# Patient Record
Sex: FEMALE
[De-identification: names, ages, dates, MRNs, and addresses within clinical notes are randomized per-mention and may not be internally consistent; named-entity substitution may affect disease eponyms.]

---

## 2018-01-11 ENCOUNTER — SURGERY (OUTPATIENT)
Age: 28
End: 2018-01-11

## 2018-01-11 ENCOUNTER — ANESTHESIA EVENT (OUTPATIENT)
Dept: SURGERY | Facility: CLINIC | Age: 28
End: 2018-01-11
Payer: COMMERCIAL

## 2018-01-11 ENCOUNTER — HOSPITAL ENCOUNTER (OUTPATIENT)
Facility: CLINIC | Age: 28
Discharge: HOME | End: 2018-01-11
Attending: DENTIST | Admitting: DENTIST
Payer: COMMERCIAL

## 2018-01-11 ENCOUNTER — ANESTHESIA (OUTPATIENT)
Dept: SURGERY | Facility: CLINIC | Age: 28
End: 2018-01-11
Payer: COMMERCIAL

## 2018-01-11 VITALS
TEMPERATURE: 97 F | OXYGEN SATURATION: 100 % | HEIGHT: 65 IN | RESPIRATION RATE: 18 BRPM | BODY MASS INDEX: 18.49 KG/M2 | WEIGHT: 111 LBS | SYSTOLIC BLOOD PRESSURE: 113 MMHG | DIASTOLIC BLOOD PRESSURE: 78 MMHG

## 2018-01-11 LAB
HCG UR QL: NEGATIVE
SEE SCANNED REPORT: NORMAL

## 2018-01-11 PROCEDURE — 27210995 ZZH RX 272: Performed by: DENTIST

## 2018-01-11 PROCEDURE — 25000132 ZZH RX MED GY IP 250 OP 250 PS 637: Performed by: DENTIST

## 2018-01-11 PROCEDURE — 71000013 ZZH RECOVERY PHASE 1 LEVEL 1 EA ADDTL HR: Performed by: DENTIST

## 2018-01-11 PROCEDURE — 25000128 H RX IP 250 OP 636: Performed by: DENTIST

## 2018-01-11 PROCEDURE — 25000125 ZZHC RX 250: Performed by: NURSE ANESTHETIST, CERTIFIED REGISTERED

## 2018-01-11 PROCEDURE — 00000159 ZZHCL STATISTIC H-SEND OUTS PREP: Performed by: DENTIST

## 2018-01-11 PROCEDURE — 71000027 ZZH RECOVERY PHASE 2 EACH 15 MINS: Performed by: DENTIST

## 2018-01-11 PROCEDURE — 36000063 ZZH SURGERY LEVEL 4 EA 15 ADDTL MIN: Performed by: DENTIST

## 2018-01-11 PROCEDURE — 81025 URINE PREGNANCY TEST: CPT | Performed by: ANESTHESIOLOGY

## 2018-01-11 PROCEDURE — 25000128 H RX IP 250 OP 636: Performed by: NURSE ANESTHETIST, CERTIFIED REGISTERED

## 2018-01-11 PROCEDURE — 71000012 ZZH RECOVERY PHASE 1 LEVEL 1 FIRST HR: Performed by: DENTIST

## 2018-01-11 PROCEDURE — 27210794 ZZH OR GENERAL SUPPLY STERILE: Performed by: DENTIST

## 2018-01-11 PROCEDURE — 40000170 ZZH STATISTIC PRE-PROCEDURE ASSESSMENT II: Performed by: DENTIST

## 2018-01-11 PROCEDURE — 37000008 ZZH ANESTHESIA TECHNICAL FEE, 1ST 30 MIN: Performed by: DENTIST

## 2018-01-11 PROCEDURE — 25000125 ZZHC RX 250: Performed by: DENTIST

## 2018-01-11 PROCEDURE — 36000093 ZZH SURGERY LEVEL 4 1ST 30 MIN: Performed by: DENTIST

## 2018-01-11 PROCEDURE — 37000009 ZZH ANESTHESIA TECHNICAL FEE, EACH ADDTL 15 MIN: Performed by: DENTIST

## 2018-01-11 RX ORDER — ONDANSETRON 2 MG/ML
4 INJECTION INTRAMUSCULAR; INTRAVENOUS EVERY 30 MIN PRN
Status: DISCONTINUED | OUTPATIENT
Start: 2018-01-11 | End: 2018-01-11 | Stop reason: HOSPADM

## 2018-01-11 RX ORDER — ONDANSETRON 4 MG/1
4 TABLET, ORALLY DISINTEGRATING ORAL EVERY 30 MIN PRN
Status: DISCONTINUED | OUTPATIENT
Start: 2018-01-11 | End: 2018-01-11 | Stop reason: HOSPADM

## 2018-01-11 RX ORDER — PROPOFOL 10 MG/ML
INJECTION, EMULSION INTRAVENOUS PRN
Status: DISCONTINUED | OUTPATIENT
Start: 2018-01-11 | End: 2018-01-11

## 2018-01-11 RX ORDER — PROPOFOL 10 MG/ML
INJECTION, EMULSION INTRAVENOUS CONTINUOUS PRN
Status: DISCONTINUED | OUTPATIENT
Start: 2018-01-11 | End: 2018-01-11

## 2018-01-11 RX ORDER — FENTANYL CITRATE 50 UG/ML
25-50 INJECTION, SOLUTION INTRAMUSCULAR; INTRAVENOUS
Status: DISCONTINUED | OUTPATIENT
Start: 2018-01-11 | End: 2018-01-11 | Stop reason: HOSPADM

## 2018-01-11 RX ORDER — BUPIVACAINE HYDROCHLORIDE AND EPINEPHRINE 5; 5 MG/ML; UG/ML
INJECTION, SOLUTION PERINEURAL PRN
Status: DISCONTINUED | OUTPATIENT
Start: 2018-01-11 | End: 2018-01-11 | Stop reason: HOSPADM

## 2018-01-11 RX ORDER — ONDANSETRON 2 MG/ML
INJECTION INTRAMUSCULAR; INTRAVENOUS PRN
Status: DISCONTINUED | OUTPATIENT
Start: 2018-01-11 | End: 2018-01-11

## 2018-01-11 RX ORDER — MEPERIDINE HYDROCHLORIDE 25 MG/ML
12.5 INJECTION INTRAMUSCULAR; INTRAVENOUS; SUBCUTANEOUS
Status: DISCONTINUED | OUTPATIENT
Start: 2018-01-11 | End: 2018-01-11 | Stop reason: HOSPADM

## 2018-01-11 RX ORDER — GLYCOPYRROLATE 0.2 MG/ML
INJECTION, SOLUTION INTRAMUSCULAR; INTRAVENOUS PRN
Status: DISCONTINUED | OUTPATIENT
Start: 2018-01-11 | End: 2018-01-11

## 2018-01-11 RX ORDER — PHYSOSTIGMINE SALICYLATE 1 MG/ML
1.2 INJECTION INTRAVENOUS
Status: DISCONTINUED | OUTPATIENT
Start: 2018-01-11 | End: 2018-01-11 | Stop reason: HOSPADM

## 2018-01-11 RX ORDER — NALOXONE HYDROCHLORIDE 0.4 MG/ML
.1-.4 INJECTION, SOLUTION INTRAMUSCULAR; INTRAVENOUS; SUBCUTANEOUS
Status: DISCONTINUED | OUTPATIENT
Start: 2018-01-11 | End: 2018-01-11 | Stop reason: HOSPADM

## 2018-01-11 RX ORDER — SODIUM CHLORIDE, SODIUM LACTATE, POTASSIUM CHLORIDE, CALCIUM CHLORIDE 600; 310; 30; 20 MG/100ML; MG/100ML; MG/100ML; MG/100ML
INJECTION, SOLUTION INTRAVENOUS CONTINUOUS PRN
Status: DISCONTINUED | OUTPATIENT
Start: 2018-01-11 | End: 2018-01-11

## 2018-01-11 RX ORDER — SODIUM CHLORIDE, SODIUM LACTATE, POTASSIUM CHLORIDE, CALCIUM CHLORIDE 600; 310; 30; 20 MG/100ML; MG/100ML; MG/100ML; MG/100ML
INJECTION, SOLUTION INTRAVENOUS CONTINUOUS
Status: DISCONTINUED | OUTPATIENT
Start: 2018-01-11 | End: 2018-01-11 | Stop reason: HOSPADM

## 2018-01-11 RX ORDER — MAGNESIUM HYDROXIDE 1200 MG/15ML
LIQUID ORAL PRN
Status: DISCONTINUED | OUTPATIENT
Start: 2018-01-11 | End: 2018-01-11 | Stop reason: HOSPADM

## 2018-01-11 RX ORDER — METHYLPREDNISOLONE SODIUM SUCCINATE 125 MG/2ML
125 INJECTION, POWDER, LYOPHILIZED, FOR SOLUTION INTRAMUSCULAR; INTRAVENOUS ONCE
Status: COMPLETED | OUTPATIENT
Start: 2018-01-11 | End: 2018-01-11

## 2018-01-11 RX ORDER — FENTANYL CITRATE 50 UG/ML
INJECTION, SOLUTION INTRAMUSCULAR; INTRAVENOUS PRN
Status: DISCONTINUED | OUTPATIENT
Start: 2018-01-11 | End: 2018-01-11

## 2018-01-11 RX ORDER — CHLORHEXIDINE GLUCONATE ORAL RINSE 1.2 MG/ML
10 SOLUTION DENTAL ONCE
Status: COMPLETED | OUTPATIENT
Start: 2018-01-11 | End: 2018-01-11

## 2018-01-11 RX ORDER — HYDROMORPHONE HYDROCHLORIDE 1 MG/ML
.3-.5 INJECTION, SOLUTION INTRAMUSCULAR; INTRAVENOUS; SUBCUTANEOUS EVERY 10 MIN PRN
Status: DISCONTINUED | OUTPATIENT
Start: 2018-01-11 | End: 2018-01-11 | Stop reason: HOSPADM

## 2018-01-11 RX ORDER — NEOSTIGMINE METHYLSULFATE 1 MG/ML
VIAL (ML) INJECTION PRN
Status: DISCONTINUED | OUTPATIENT
Start: 2018-01-11 | End: 2018-01-11

## 2018-01-11 RX ORDER — KETOROLAC TROMETHAMINE 30 MG/ML
30 INJECTION, SOLUTION INTRAMUSCULAR; INTRAVENOUS ONCE
Status: DISCONTINUED | OUTPATIENT
Start: 2018-01-11 | End: 2018-01-11 | Stop reason: HOSPADM

## 2018-01-11 RX ORDER — LIDOCAINE HYDROCHLORIDE 20 MG/ML
INJECTION, SOLUTION INFILTRATION; PERINEURAL PRN
Status: DISCONTINUED | OUTPATIENT
Start: 2018-01-11 | End: 2018-01-11

## 2018-01-11 RX ORDER — FENTANYL CITRATE 50 UG/ML
25-50 INJECTION, SOLUTION INTRAMUSCULAR; INTRAVENOUS EVERY 5 MIN PRN
Status: DISCONTINUED | OUTPATIENT
Start: 2018-01-11 | End: 2018-01-11 | Stop reason: HOSPADM

## 2018-01-11 RX ADMIN — ROCURONIUM BROMIDE 15 MG: 10 INJECTION INTRAVENOUS at 10:36

## 2018-01-11 RX ADMIN — GLYCOPYRROLATE 0.4 MG: 0.2 INJECTION, SOLUTION INTRAMUSCULAR; INTRAVENOUS at 11:36

## 2018-01-11 RX ADMIN — METHYLPREDNISOLONE SODIUM SUCCINATE 125 MG: 125 INJECTION, POWDER, FOR SOLUTION INTRAMUSCULAR; INTRAVENOUS at 10:47

## 2018-01-11 RX ADMIN — BUPIVACAINE HYDROCHLORIDE AND EPINEPHRINE BITARTRATE 6 ML: 5; .005 INJECTION, SOLUTION PERINEURAL at 10:54

## 2018-01-11 RX ADMIN — LIDOCAINE HYDROCHLORIDE,EPINEPHRINE BITARTRATE 8 ML: 20; .01 INJECTION, SOLUTION INFILTRATION; PERINEURAL at 10:52

## 2018-01-11 RX ADMIN — ONDANSETRON 4 MG: 2 INJECTION INTRAMUSCULAR; INTRAVENOUS at 11:15

## 2018-01-11 RX ADMIN — SODIUM CHLORIDE 1000 ML: 900 IRRIGANT IRRIGATION at 11:10

## 2018-01-11 RX ADMIN — LIDOCAINE HYDROCHLORIDE 40 MG: 20 INJECTION, SOLUTION INFILTRATION; PERINEURAL at 10:36

## 2018-01-11 RX ADMIN — CHLORHEXIDINE GLUCONATE 15 ML: 1.2 RINSE ORAL at 09:32

## 2018-01-11 RX ADMIN — FENTANYL CITRATE 25 MCG: 50 INJECTION, SOLUTION INTRAMUSCULAR; INTRAVENOUS at 10:51

## 2018-01-11 RX ADMIN — PROPOFOL 200 MG: 10 INJECTION, EMULSION INTRAVENOUS at 10:36

## 2018-01-11 RX ADMIN — PROPOFOL 200 MCG/KG/MIN: 10 INJECTION, EMULSION INTRAVENOUS at 10:36

## 2018-01-11 RX ADMIN — FENTANYL CITRATE 50 MCG: 50 INJECTION, SOLUTION INTRAMUSCULAR; INTRAVENOUS at 10:35

## 2018-01-11 RX ADMIN — NEOSTIGMINE METHYLSULFATE 3 MG: 1 INJECTION INTRAMUSCULAR; INTRAVENOUS; SUBCUTANEOUS at 11:36

## 2018-01-11 RX ADMIN — MIDAZOLAM 2 MG: 1 INJECTION INTRAMUSCULAR; INTRAVENOUS at 10:35

## 2018-01-11 RX ADMIN — SODIUM CHLORIDE, POTASSIUM CHLORIDE, SODIUM LACTATE AND CALCIUM CHLORIDE: 600; 310; 30; 20 INJECTION, SOLUTION INTRAVENOUS at 10:34

## 2018-01-11 RX ADMIN — SODIUM CHLORIDE, POTASSIUM CHLORIDE, SODIUM LACTATE AND CALCIUM CHLORIDE: 600; 310; 30; 20 INJECTION, SOLUTION INTRAVENOUS at 11:37

## 2018-01-11 RX ADMIN — CEFAZOLIN SODIUM 2 G: 2 SOLUTION INTRAVENOUS at 10:43

## 2018-01-11 NOTE — DISCHARGE INSTRUCTIONS
Same Day Surgery Discharge Instructions for  Sedation and General Anesthesia       It's not unusual to feel dizzy, light-headed or faint for up to 24 hours after surgery or while taking pain medication.  If you have these symptoms: sit for a few minutes before standing and have someone assist you when you get up to walk or use the bathroom.      You should rest and relax for the next 24 hours. We recommend you make arrangements to have an adult stay with you for at least 24 hours after your discharge.  Avoid hazardous and strenuous activity.      DO NOT DRIVE any vehicle or operate mechanical equipment for 24 hours following the end of your surgery.  Even though you may feel normal, your reactions may be affected by the medication you have received.      Do not drink alcoholic beverages for 24 hours following surgery.       Slowly progress to your regular diet as you feel able. It's not unusual to feel nauseated and/or vomit after receiving anesthesia.  If you develop these symptoms, drink clear liquids (apple juice, ginger ale, broth, 7-up, etc. ) until you feel better.  If your nausea and vomiting persists for 24 hours, please notify your surgeon.        All narcotic pain medications, along with inactivity and anesthesia, can cause constipation. Drinking plenty of liquids and increasing fiber intake will help.      For any questions of a medical nature, call your surgeon.      Do not make important decisions for 24 hours.      If you had general anesthesia, you may have a sore throat for a couple of days related to the breathing tube used during surgery.  You may use Cepacol lozenges to help with this discomfort.  If it worsens or if you develop a fever, contact your surgeon.       If you feel your pain is not well managed with the pain medications prescribed by your surgeon, please contact your surgeon's office to let them know so they can address your concerns.         MOUTH CARE FOLLOWING ORAL SURGERY  [facility]  Efrain Carpio, Charity, Esther, Joe, Cal Royal, Jennifer Limon, Chelsea, Nolan      Immediately following your surgery:   - When you get home, remove the gauze.  Do not replace unless you see active  bleeding (pooling/dripping).   - Begin brushing your teeth the evening of surgery.   - Avoid rigorous exercise and get adequate rest for the first two to three days.   - If antibiotics are prescribed, please begin taking these the day of surgery.  If you  are nauseated, wait until the next day.   - Do not drive for 24 hours after having I.V. Anesthesia.  Do not drive while taking a prescription pain medication (not including prescription ibuprofen).   - For the first 7 days, avoid smoking, spitting, drinking with straws, or vigorous rinsing.    1.  BLEEDING:  Some oozing may continue for the first 24-48 hours and is not unexpected.  If bleeding persists, apply the gauze directly over the extraction site and bite down firmly for 30 minutes.  You may remove the gauze to eat or drink and replace if needed.  2.  SWELLING:  Apply ice packs on and off for 30 minutes for 24-48 hours after your surgery, excluding overnight, to the outside of your face over the surgery site(s).  Do not apply heat.  Swelling is to be expected following surgery and peaks 2-3 days after.  Elevating your head in the first 48 hours will minimize swelling.  3.  TOBACCO:  Do not smoke or use tobacco products for 7 days.  Smoking greatly increases your risk of infections and dry sockets and will delay healing.  4.  RINSES:  Beginning the day after surgery, dissolve 1/4 teaspoon of salt in a full glass of warm water.  Rinse four times per day (after meals and before bed) for one week.  If given a prescription mouth rinse, use that in addition to salt water rinses for one week, starting the day of surgery.  5.  DIET:  After surgery while you are still numb, eat cool, soft foods.  Once numbness wears  "off, eat any diet you can tolerate excluding peanuts, popcorn, chips and other crunchy foods that are likely to become \"stuck\" in extraction sockets.  6.  REST & FLUIDS:  After I.V. anesthesia, drink plenty of fluids.  Be sure to get 8-10 hours of sleep at night.  Do NOT use straws for one week following surgery.  7.  PAIN:  If given prescription ibuprofen, take as directed.  Otherwise we recommend 600mg (three over-the-counter ibuprofen) immediately after surgery and then every 6 hours for two to three days.  The stronger pain medication may be used in addition if necessary (make sure to take with food to avoid nausea).   8.  FEVER:  A low grade fever is likely.  If questionable, do not hesitate to call.  9.  NAUSEA:  Nausea may occur following general anesthesia.  Treat with clear liquids and advance diet as tolerated.  If vomiting is a problem, call our office.  10.  STITCHES:  If stitches were used, they are usually dissolvable.  You will be advised by our office if you have sutures that require removal at a later appointment.  11.  DRY SOCKETS:  Soreness is common for the first couple of days after surgery.  If pain increases (throbbing, waking up at night) after 3-6 days, call our office.  12.  NUMBNESS:  We often use a long-acting local anesthetic that may remain in effect the entire day.        EMERGENCY CALLS  If you have questions or concerns, please call our office between the hours of 7am to 4pm Monday through Friday.  If you have an emergency, please call our office; if during non-business hours, the answering service will contact the doctor on call.  We do not refill pain prescriptions during the evenings or weekends.    April Alba  [facility]  [phone]         **If you have questions or concerns about your procedure,   call Dr. Mccall at [phone]**            "

## 2018-01-11 NOTE — IP AVS SNAPSHOT
MRN:[id number]                      After Visit Summary   1/11/2018    Brigida Whyte    MRN: [id number]           Thank you!     Thank you for choosing [facility] for your care. Our goal is always to provide you with excellent care. Hearing back from our patients is one way we can continue to improve our services. Please take a few minutes to complete the written survey that you may receive in the mail after you visit with us. Thank you!        Patient Information     Date Of Birth          1990        About your hospital stay     You were admitted on:  January 11, 2018 [facility]    You were discharged on:  January 11, 2018        Reason for your hospital stay       SDS extract impacted tooth 27 and excision odontogenic lesion mandible.                  Who to Call     For medical emergencies, please call 911.  For non-urgent questions about your medical care, please call your primary care provider or clinic, None  For questions related to your surgery, please call your surgery clinic        Attending Provider     Provider Raúl Correa DDS Oral Surgery       Primary Care Provider Fax #    Physician No Ref-Primary [phone]      Follow-up Appointments     Follow-up and recommended labs and tests        FU check in clinic 5-7 days.                  Further instructions from your care team         Same Day Surgery Discharge Instructions for  Sedation and General Anesthesia       It's not unusual to feel dizzy, light-headed or faint for up to 24 hours after surgery or while taking pain medication.  If you have these symptoms: sit for a few minutes before standing and have someone assist you when you get up to walk or use the bathroom.      You should rest and relax for the next 24 hours. We recommend you make arrangements to have an adult stay with you for at least 24 hours after your discharge.  Avoid hazardous and strenuous  activity.      DO NOT DRIVE any vehicle or operate mechanical equipment for 24 hours following the end of your surgery.  Even though you may feel normal, your reactions may be affected by the medication you have received.      Do not drink alcoholic beverages for 24 hours following surgery.       Slowly progress to your regular diet as you feel able. It's not unusual to feel nauseated and/or vomit after receiving anesthesia.  If you develop these symptoms, drink clear liquids (apple juice, ginger ale, broth, 7-up, etc. ) until you feel better.  If your nausea and vomiting persists for 24 hours, please notify your surgeon.        All narcotic pain medications, along with inactivity and anesthesia, can cause constipation. Drinking plenty of liquids and increasing fiber intake will help.      For any questions of a medical nature, call your surgeon.      Do not make important decisions for 24 hours.      If you had general anesthesia, you may have a sore throat for a couple of days related to the breathing tube used during surgery.  You may use Cepacol lozenges to help with this discomfort.  If it worsens or if you develop a fever, contact your surgeon.       If you feel your pain is not well managed with the pain medications prescribed by your surgeon, please contact your surgeon's office to let them know so they can address your concerns.         MOUTH CARE FOLLOWING ORAL SURGERY  [facility]  Charity Perdomo, Esther, Joe, Cal Royal, Jennifer Limon, Chelsea, Nolan      Immediately following your surgery:   - When you get home, remove the gauze.  Do not replace unless you see active  bleeding (pooling/dripping).   - Begin brushing your teeth the evening of surgery.   - Avoid rigorous exercise and get adequate rest for the first two to three days.   - If antibiotics are prescribed, please begin taking these the day of surgery.  If you  are nauseated, wait until the  "next day.   - Do not drive for 24 hours after having I.V. Anesthesia.  Do not drive while taking a prescription pain medication (not including prescription ibuprofen).   - For the first 7 days, avoid smoking, spitting, drinking with straws, or vigorous rinsing.    1.  BLEEDING:  Some oozing may continue for the first 24-48 hours and is not unexpected.  If bleeding persists, apply the gauze directly over the extraction site and bite down firmly for 30 minutes.  You may remove the gauze to eat or drink and replace if needed.  2.  SWELLING:  Apply ice packs on and off for 30 minutes for 24-48 hours after your surgery, excluding overnight, to the outside of your face over the surgery site(s).  Do not apply heat.  Swelling is to be expected following surgery and peaks 2-3 days after.  Elevating your head in the first 48 hours will minimize swelling.  3.  TOBACCO:  Do not smoke or use tobacco products for 7 days.  Smoking greatly increases your risk of infections and dry sockets and will delay healing.  4.  RINSES:  Beginning the day after surgery, dissolve 1/4 teaspoon of salt in a full glass of warm water.  Rinse four times per day (after meals and before bed) for one week.  If given a prescription mouth rinse, use that in addition to salt water rinses for one week, starting the day of surgery.  5.  DIET:  After surgery while you are still numb, eat cool, soft foods.  Once numbness wears off, eat any diet you can tolerate excluding peanuts, popcorn, chips and other crunchy foods that are likely to become \"stuck\" in extraction sockets.  6.  REST & FLUIDS:  After I.V. anesthesia, drink plenty of fluids.  Be sure to get 8-10 hours of sleep at night.  Do NOT use straws for one week following surgery.  7.  PAIN:  If given prescription ibuprofen, take as directed.  Otherwise we recommend 600mg (three over-the-counter ibuprofen) immediately after surgery and then every 6 hours for two to three days.  The stronger pain " "medication may be used in addition if necessary (make sure to take with food to avoid nausea).   8.  FEVER:  A low grade fever is likely.  If questionable, do not hesitate to call.  9.  NAUSEA:  Nausea may occur following general anesthesia.  Treat with clear liquids and advance diet as tolerated.  If vomiting is a problem, call our office.  10.  STITCHES:  If stitches were used, they are usually dissolvable.  You will be advised by our office if you have sutures that require removal at a later appointment.  11.  DRY SOCKETS:  Soreness is common for the first couple of days after surgery.  If pain increases (throbbing, waking up at night) after 3-6 days, call our office.  12.  NUMBNESS:  We often use a long-acting local anesthetic that may remain in effect the entire day.        EMERGENCY CALLS  If you have questions or concerns, please call our office between the hours of 7am to 4pm Monday through Friday.  If you have an emergency, please call our office; if during non-business hours, the answering service will contact the doctor on call.  We do not refill pain prescriptions during the evenings or weekends.    [place]   [facility]  [phone]         **If you have questions or concerns about your procedure,   call Dr. Mccall at [phone]**              Pending Results     Date and Time Order Name Status Description    1/11/2018 1119 Surgical pathology exam In process             Admission Information     Date & Time Provider Department Dept. Phone    1/11/2018 Raúl Mccall, [facility] Same Day Surgery [phone]      Your Vitals Were     Blood Pressure Temperature Respirations Height Weight Last Period    113/78 97  F (36.1  C) (Temporal) 18 1.651 m (5' 5\") 50.3 kg (111 lb) 01/09/2018    Pulse Oximetry BMI (Body Mass Index)                100% 18.47 kg/m2          MyChart Information     [employer] lets you send messages to your doctor, " "view your test results, renew your prescriptions, schedule appointments and more. To sign up, go to [email or web address] . Click on \"Log in\" on the left side of the screen, which will take you to the Welcome page. Then click on \"Sign up Now\" on the right side of the page.     You will be asked to enter the access code listed below, as well as some personal information. Please follow the directions to create your username and password.     Your access code is: [email or web address]  Expires: 2018 12:08 PM     Your access code will  in 90 days. If you need help or a new code, please call your [facility] clinic or [phone].        Care EveryWhere ID     This is your Care EveryWhere ID. This could be used by other organizations to access your [facility] medical records  [id number]        Equal Access to Services     [facility] : Karel Sumner, bernardo amaral, toni rivers, iftikhar bradley . So [facility] [phone].    ATENCIÓN: Si habla español, tiene a hull disposición servicios gratuitos de asistencia lingüística. Sariahkoko al [phone].    We comply with applicable federal civil rights laws and [place] laws. We do not discriminate on the basis of race, color, national origin, age, disability, sex, sexual orientation, or gender identity.               Review of your medicines      UNREVIEWED medicines. Ask your doctor about these medicines        Dose / Directions    IBUPROFEN PO        Dose:  200 mg   Take 200 mg by mouth every 4 hours as needed for moderate pain   Refills:  0                Protect others around you: Learn how to safely use, store and throw away your medicines at www.disposemymeds.org.             Medication List: This is a list of all your medications and when to take them. Check marks below indicate your daily home schedule. Keep this list as a reference.      Medications           Morning Afternoon Evening Bedtime As Needed    IBUPROFEN " PO   Take 200 mg by mouth every 4 hours as needed for moderate pain

## 2018-01-11 NOTE — ANESTHESIA POSTPROCEDURE EVALUATION
Patient: Brigida Whyte    Procedure(s):  EXTRACT TOOTH #27 ALONG WITH CYST AROUND TOOTH #27 - Wound Class: II-Clean Contaminated   - Wound Class: I-Clean    Diagnosis:IMPACTED TOOTH, OTOGYNCT CYST  Diagnosis Additional Information: No value filed.    Anesthesia Type:  General, ETT    Note:  Anesthesia Post Evaluation    Patient location during evaluation: PACU  Patient participation: Able to fully participate in evaluation  Level of consciousness: awake  Pain management: adequate  Airway patency: patent  Cardiovascular status: acceptable  Respiratory status: acceptable  Hydration status: acceptable  PONV: none     Anesthetic complications: None          Last vitals:  Vitals:    01/11/18 0922 01/11/18 1155 01/11/18 1200   BP: 114/77 118/86 110/77   Resp: 16 13 20   Temp: 36.1  C (97  F) 36.1  C (96.9  F)    SpO2: 100% 100% 100%         Electronically Signed By: Khoi Hernandez MD  January 11, 2018  12:12 PM

## 2018-01-11 NOTE — ANESTHESIA PREPROCEDURE EVALUATION
Anesthesia Evaluation     . Pt has had prior anesthetic. Type: MAC    No history of anesthetic complications          ROS/MED HX    ENT/Pulmonary:       Neurologic:       Cardiovascular:         METS/Exercise Tolerance:     Hematologic:         Musculoskeletal:         GI/Hepatic:     (+) hepatitis (Hepatitis B carrier) type B,       Renal/Genitourinary:         Endo:         Psychiatric:         Infectious Disease:         Malignancy:         Other:                     Physical Exam  Normal systems: cardiovascular and pulmonary    Airway   Mallampati: II  TM distance: >3 FB  Neck ROM: full    Dental     Cardiovascular       Pulmonary                     Anesthesia Plan      History & Physical Review  History and physical reviewed and following examination; no interval change.    ASA Status:  2 .    NPO Status:  > 8 hours    Plan for General and ETT with Intravenous and Propofol induction. Maintenance will be TIVA.    PONV prophylaxis:  Ondansetron (or other 5HT-3) and Dexamethasone or Solumedrol       Postoperative Care  Postoperative pain management:  IV analgesics and Oral pain medications.      Consents  Anesthetic plan, risks, benefits and alternatives discussed with:  Patient..      DPreop diagnosis: IMPACTED TOOTH, OTOGYNCT CYST  Procedure(s):  EXTRACTION(S) DENTAL  EXCISE BONE CYST MANDIBULAR  No Known Allergies    No current facility-administered medications on file prior to encounter.   No current outpatient prescriptions on file prior to encounter.  No results found for: HGB, INR, POTASSIUM                  .

## 2018-01-11 NOTE — OP NOTE
Oral & Maxillofacial Surgery Immediate Post-Operative Note    Date/Time:  2018     Patient Information:  Patient Name: Brigida Whyte  MRN: [id number]   : 1990    Pre-Op Diagnosis(es):  1.  Impacted tooth 27   2.  Compound Odontoma/Odontogenic lesion    Post-Op Diagnosis(es):  1.  same  2.      Procedure(s):  1.  Surgical extraction #27  2.  Excision Odontogenic Lesion Mandible    Anesthesia:  General Anesthesia via Oral tube    Surgeon(s):  Raúl Mccall  Assistant(s):  Jelly    Estimated Blood Loss:  4cc  Replacements:  Crystalloid  Drains:  none    Specimens:  Submitted tooth and odontogenic lesion    Complications:  none    Findings:  As dictated.    Signature:  Raúl Mccall    [facility]  [address], [address]  [place] [address]  Clinic/On-call Phone: [phone]  Clinic Fax: [phone]

## 2018-01-11 NOTE — ANESTHESIA CARE TRANSFER NOTE
Patient: Brigida Whyte    Procedure(s):  EXTRACT TOOTH #27 ALONG WITH CYST AROUND TOOTH #27 - Wound Class: II-Clean Contaminated   - Wound Class: I-Clean    Diagnosis: IMPACTED TOOTH, OTOGYNCT CYST  Diagnosis Additional Information: No value filed.    Anesthesia Type:   General, ETT     Note:  Airway :Face Mask  Patient transferred to:PACU  Comments: VSS      Vitals: (Last set prior to Anesthesia Care Transfer)    CRNA VITALS  1/11/2018 1122 - 1/11/2018 1157      1/11/2018             Resp Rate (set): 10                Electronically Signed By: DUANE Ferraro CRNA  January 11, 2018  11:57 AM

## 2018-01-11 NOTE — IP AVS SNAPSHOT
[facility] Same Day Surgery    [address]    [place] [address]    Phone:  [phone]    Fax:  [phone]                                       After Visit Summary   1/11/2018    Brigida Whyte    MRN: [id number]           After Visit Summary Signature Page     I have received my discharge instructions, and my questions have been answered. I have discussed any challenges I see with this plan with the nurse or doctor.    ..........................................................................................................................................  Patient/Patient Representative Signature      ..........................................................................................................................................  Patient Representative Print Name and Relationship to Patient    ..................................................               ................................................  Date                                            Time    ..........................................................................................................................................  Reviewed by Signature/Title    ...................................................              ..............................................  Date                                                            Time

## 2018-01-12 NOTE — OP NOTE
DATE OF OPERATION:  01/11/2018      PREOPERATIVE DIAGNOSES:   1.  Impacted tooth #27.   2.  Benign odontogenic lesion, right parasymphysis mandible.      POSTOPERATIVE DIAGNOSES:   1.  Impacted tooth #27.   2.  Benign odontogenic lesion, right parasymphysis mandible.      PROCEDURE PERFORMED:   1.  Surgical extraction of impacted tooth #27.   2.  Surgical excision of benign odontogenic lesion, right parasymphysis.      SURGEON:  Raúl Mccall DDS      ANESTHETIC:  General anesthetic with oral intubation.      ESTIMATED BLOOD LOSS FOR THE CASE:  4 cc.      REPLACEMENT:  Crystalloid.      HISTORY OF PRESENT ILLNESS:  Brigida Whyte is a 28-year-old woman who presented for evaluation regarding impacted tooth #27.  This was noted by her orthodontist who had been providing full banded orthodontic therapy, and in conjunction with this, had taken routine radiographs.  This was an incidental finding of note, and she was referred for evaluation.  Her past medical history is significant for hepatitis B.  Due to the location of the lesion and the need to excise an associated benign odontogenic lesion, it is deemed most appropriate to perform the treatment in a Same Day Surgery setting under general anesthetic.      Physical examination reveals a pleasant woman, appearing stated age, with full orthodontic appliances.  She has a Class I occlusion, and soft tissue examination is normal.  There are no neurologic deficits.  There are no swellings or masses noted.  Clinically, she is missing tooth #27.  It is absent from the arch of teeth.      Radiographically, she does demonstrate a vertically impacted tooth #27 located in the right parasymphysis.  Associated with this, on 3D scan, is noted to be a multilobular calcified-appearing lesion lying on the lingual and medial aspect of the impacted tooth #27.  It measures approximately 8 x 10 mm in magnitude.      Assessment:  As is above, odontogenic lesion, benign, odontoma, with impacted  associated tooth #27, ASA 2 patient.  The risks and complications of the treatment as well as treatment alternatives including no treatment were discussed at length.  The risks and complications discussed include but are not limited to pain, swelling, infection, bleeding, need for dietary constraints and salt water rinses, as well as checkups.  We discussed the pathology report, and the need for checkups, and possible delay in orthodontic therapy while healing is progressing.  All questions have been answered.  Consent has been signed.  The patient desires to proceed.      OPERATIVE PROCEDURE:  The patient was taken to the main OR and placed supine on the operating room table.  A smooth induction of general anesthetic was performed, oral intubation achieved, and the tube was secured by the Anesthesia Service.  The patient was prepped and draped in the usual fashion for the procedure.  A moistened throat pack was placed.  2% lidocaine as well as 0.5% Marcaine were utilized to perform nerve blocks and infiltrations.  A 15 blade was utilized to perform a distal release adjacent to tooth #28.  A gingival crevicular incision was performed to the midline, and a full thickness mucoperiosteal flap was reflected to expose the right parasymphysis.  A drill with copious irrigation was utilized to begin the exposure of tooth #27 which, when encountered, was then divided into four separate fragments, and elevated and delivered in its entirety in an uncomplicated fashion.  A calcified-appearing lesion was located on the lingual or medial aspect of the mandibular cortex.  The area was carefully inspected, and utilizing curets and a drill, these small calcified fragments were very carefully elevated, and delivered for histopathologic evaluation in their entirety, as it appears.  This was submitted with the coronal aspect of tooth #27 and follicular tissue.  Thorough irrigation and debridement of the surgical site was completed, and  Gelfoam was placed.  Attention was then turned to very careful closure of the soft tissue incision, which was completed utilizing multiple interrupted and interdental 4-0 Rapide sutures.  Excellent soft tissue closure was noted.  Good hemostasis was achieved.  The patient was noted to tolerate the procedure well.  The previously placed moistened throat pack was removed, and the mouth was cleared of all instrumentation.  The patient was noted to tolerate the treatment nicely, and was returned to the hands of Anesthesia for extubation in the main OR following suitable recovery from the general anesthetic.  All sponge and needle counts correct x2.         BRAXTON THOMPSON DDS             D: 2018 11:55   T: 2018 05:23   MT: [id number]#[id number]      Name:     CURTIS PERES   MRN:      [id number]        Account:        [id number]   :      1990           Procedure Date: 2018      Document: [id number]

## 2022-10-28 NOTE — OR NURSING
PNDS met, po per I&O sheet. Pt dressed, up in recliner and transported to Phase 2.   
PNDS met, po per I&O sheet. Pt dressed, up in recliner and transported to Phase 2.   
VTE

## (undated) DEVICE — LINEN TOWEL PACK X5 5464

## (undated) DEVICE — BUR DENTAL 1.75X75MM STRAIGHT 560

## (undated) DEVICE — SU PLAIN 3-0 X-1 18" 612G

## (undated) DEVICE — SU VICRYL RAPIDE PS-2 VR496

## (undated) DEVICE — GLOVE PROTEXIS W/NEU-THERA 7.5  2D73TE75

## (undated) DEVICE — GLOVE PROTEXIS W/NEU-THERA 6.5  2D73TE65

## (undated) DEVICE — GLOVE PROTEXIS W/NEU-THERA 7.0  2D73TE70

## (undated) DEVICE — SU CHROMIC 4-0 FS-2 27" 635H

## (undated) DEVICE — NDL DENTAL 27GA LONG 8881400058

## (undated) DEVICE — GOWN IMPERVIOUS BREATHABLE SMART LG 89015

## (undated) DEVICE — GLOVE PROTEXIS BLUE W/NEU-THERA 7.0  2D73EB70

## (undated) DEVICE — SU UMBILICAL TAPE 18X.125" U10T

## (undated) DEVICE — SPONGE SURGIFOAM 100 1974

## (undated) DEVICE — SOL NACL 0.9% IRRIG 1000ML BOTTLE 07138-09

## (undated) DEVICE — SUCTION CANISTER MEDIVAC LINER 3000ML W/LID 65651-530

## (undated) DEVICE — PACK HEAD NECK SEN15HNFSF

## (undated) DEVICE — SPONGE PACK VAGINAL 2X36"

## (undated) RX ORDER — PROPOFOL 10 MG/ML
INJECTION, EMULSION INTRAVENOUS
Status: DISPENSED
Start: 2018-01-11

## (undated) RX ORDER — CHLORHEXIDINE GLUCONATE ORAL RINSE 1.2 MG/ML
SOLUTION DENTAL
Status: DISPENSED
Start: 2018-01-11

## (undated) RX ORDER — ONDANSETRON 2 MG/ML
INJECTION INTRAMUSCULAR; INTRAVENOUS
Status: DISPENSED
Start: 2018-01-11

## (undated) RX ORDER — METHYLPREDNISOLONE SODIUM SUCCINATE 125 MG/2ML
INJECTION, POWDER, LYOPHILIZED, FOR SOLUTION INTRAMUSCULAR; INTRAVENOUS
Status: DISPENSED
Start: 2018-01-11

## (undated) RX ORDER — FENTANYL CITRATE 50 UG/ML
INJECTION, SOLUTION INTRAMUSCULAR; INTRAVENOUS
Status: DISPENSED
Start: 2018-01-11

## (undated) RX ORDER — LIDOCAINE HYDROCHLORIDE 20 MG/ML
INJECTION, SOLUTION EPIDURAL; INFILTRATION; INTRACAUDAL; PERINEURAL
Status: DISPENSED
Start: 2018-01-11

## (undated) RX ORDER — GLYCOPYRROLATE 0.2 MG/ML
INJECTION, SOLUTION INTRAMUSCULAR; INTRAVENOUS
Status: DISPENSED
Start: 2018-01-11

## (undated) RX ORDER — DEXAMETHASONE SODIUM PHOSPHATE 4 MG/ML
INJECTION, SOLUTION INTRA-ARTICULAR; INTRALESIONAL; INTRAMUSCULAR; INTRAVENOUS; SOFT TISSUE
Status: DISPENSED
Start: 2018-01-11